# Patient Record
Sex: MALE | Race: AMERICAN INDIAN OR ALASKA NATIVE
[De-identification: names, ages, dates, MRNs, and addresses within clinical notes are randomized per-mention and may not be internally consistent; named-entity substitution may affect disease eponyms.]

---

## 2018-07-09 ENCOUNTER — HOSPITAL ENCOUNTER (EMERGENCY)
Dept: HOSPITAL 5 - ED | Age: 28
Discharge: HOME | End: 2018-07-09
Payer: COMMERCIAL

## 2018-07-09 VITALS — SYSTOLIC BLOOD PRESSURE: 116 MMHG | DIASTOLIC BLOOD PRESSURE: 74 MMHG

## 2018-07-09 DIAGNOSIS — F17.200: ICD-10-CM
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
## 2018-07-09 DIAGNOSIS — S16.1XXA: Primary | ICD-10-CM

## 2018-07-09 DIAGNOSIS — M62.830: ICD-10-CM
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
## 2018-07-09 DIAGNOSIS — J45.909: ICD-10-CM

## 2018-07-09 PROCEDURE — 72125 CT NECK SPINE W/O DYE: CPT

## 2018-07-09 PROCEDURE — 99284 EMERGENCY DEPT VISIT MOD MDM: CPT
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
